# Patient Record
Sex: MALE | Race: WHITE | NOT HISPANIC OR LATINO | Employment: FULL TIME | URBAN - METROPOLITAN AREA
[De-identification: names, ages, dates, MRNs, and addresses within clinical notes are randomized per-mention and may not be internally consistent; named-entity substitution may affect disease eponyms.]

---

## 2024-08-20 ENCOUNTER — OFFICE VISIT (OUTPATIENT)
Age: 44
End: 2024-08-20
Payer: COMMERCIAL

## 2024-08-20 ENCOUNTER — TELEPHONE (OUTPATIENT)
Age: 44
End: 2024-08-20

## 2024-08-20 VITALS
DIASTOLIC BLOOD PRESSURE: 82 MMHG | HEART RATE: 66 BPM | BODY MASS INDEX: 32.86 KG/M2 | WEIGHT: 242.6 LBS | TEMPERATURE: 98.2 F | OXYGEN SATURATION: 98 % | SYSTOLIC BLOOD PRESSURE: 112 MMHG | HEIGHT: 72 IN

## 2024-08-20 DIAGNOSIS — G47.33 OSA (OBSTRUCTIVE SLEEP APNEA): Primary | ICD-10-CM

## 2024-08-20 DIAGNOSIS — F51.01 PRIMARY INSOMNIA: ICD-10-CM

## 2024-08-20 PROCEDURE — 99203 OFFICE O/P NEW LOW 30 MIN: CPT | Performed by: INTERNAL MEDICINE

## 2024-08-20 RX ORDER — FLUTICASONE PROPIONATE 50 MCG
1 SPRAY, SUSPENSION (ML) NASAL DAILY
COMMUNITY

## 2024-08-20 RX ORDER — FEXOFENADINE HCL 180 MG/1
180 TABLET ORAL DAILY
COMMUNITY

## 2024-08-20 RX ORDER — TRAZODONE HYDROCHLORIDE 50 MG/1
50 TABLET, FILM COATED ORAL
Qty: 30 TABLET | Refills: 2 | Status: SHIPPED | OUTPATIENT
Start: 2024-08-20

## 2024-08-20 NOTE — ASSESSMENT & PLAN NOTE
Occasionally has sleep maintenance insomnia.  Prescribed trazodone 50 mg nightly as needed.  He uses it sparingly most of the time on the weekends

## 2024-08-20 NOTE — PROGRESS NOTES
Sleep Medicine Outpatient Note   Brock Dunne 43 y.o. male MRN: [id number]  8/20/2024      Referring Physician: Self referred    Reason for Consultation:    Chief Complaint   Patient presents with    Sleep Apnea     Assessment/Plan:    1. OTIS (obstructive sleep apnea)  Assessment & Plan:  History of mild obstructive sleep apnea diagnosed in 2021 JALEN 13.  Started on auto titrating CPAP and deriving symptomatic benefit.  CPAP is also helping with dryness of the nasal membranes.    He is fully compliant with CPAP    Compliance data  8/19/24  83% use more than 4 hours over the past 30 days  Average use 8 hours and 42 minutes  AirSense 10 4.8-20 cm H2O with EPR at 3  Median pressure 7.2, 95th percentile 10.3  Median leak 0, 95th percentile 2.6  Residual AHI 1.0    Resupply prescription sent to [facility], nasal N20 mask  Follow up in 1 year  Orders:  -     PAP DME Resupply/Reorder  2. Primary insomnia  Assessment & Plan:  Occasionally has sleep maintenance insomnia.  Prescribed trazodone 50 mg nightly as needed.  He uses it sparingly most of the time on the weekends  Orders:  -     traZODone (DESYREL) 50 mg tablet; Take 1 tablet (50 mg total) by mouth daily at bedtime      Health Maintenance  Immunization History   Administered Date(s) Administered    COVID-19 J&J (SpotHero) vaccine 0.5 mL 03/20/2021    COVID-19 PFIZER VACCINE 0.3 ML IM 01/08/2022    COVID-19 Pfizer mRNA vacc PF gabi-sucrose 12 yr and older (Comirnaty) 10/10/2023        Return in about 1 year (around 8/20/2025).    History of Present Illness   HPI:  Brock Dunne is a 43 y.o. male who has a past medical history of allergic rhinitis and obstructive sleep apnea who is presenting to establish care for OTIS    Previously was seen at [facility].  He has been on CPAP since 2021.  Initially had a HST 11/2020 that showed JALEN 3.7 which was suspected to be a false negative.  Subsequently underwent a HST that showed JALEN 13.  OTIS symptoms included snoring,  witnessed apneas, significant bruxism biting through his mouthpiece, sweating through the night, waking up with heart racing/gasping, reflux.  He has been started on CPAP and has gained symptomatic benefit and is fully compliant.  In the past has had trouble with sleep maintenance and required a brief course of trazodone but stopped it years ago.  He only took it occasionally.  He is currently consistent with his sleep time 7 days a week.  He works as a scrub woodshop teacher.  He usually sleeps from 9 to 5a.  He will wake up several times over night around 12, 2, and 4am.    Recently has had some trouble waking up at 2 AM and having difficulty fall back into sleep.He is having some racing thoughts at night.    His first machine broke in October 2023 and says it was taking a long time for him to acquire his new CPAP he had his old machine repaired.  He is currently using that AirSense 10.  He is getting regular replacement supplies.  He is using a nasal mask without any significant side effects    No restless legs, parasomnias, cataplexy, hallucinations.    [place] 10    Historical Information   History reviewed. No pertinent past medical history.  History reviewed. No pertinent surgical history.  History reviewed. No pertinent family history.      Meds/Allergies     Current Outpatient Medications:     fexofenadine (ALLEGRA) 180 MG tablet, Take 180 mg by mouth daily, Disp: , Rfl:     fluticasone (FLONASE) 50 mcg/act nasal spray, 1 spray into each nostril daily, Disp: , Rfl:     traZODone (DESYREL) 50 mg tablet, Take 1 tablet (50 mg total) by mouth daily at bedtime, Disp: 30 tablet, Rfl: 2  No Known Allergies    Vitals: Blood pressure 112/82, pulse 66, temperature 98.2 °F (36.8 °C), temperature source Tympanic, height 6' (1.829 m), weight 110 kg (242 lb 9.6 oz), SpO2 98%. Body mass index is 32.9 kg/m². Oxygen Therapy  SpO2: 98 %  Oxygen Therapy: None (Room air)      Physical Exam  Vitals and nursing note reviewed.  "  Constitutional:       General: He is not in acute distress.     Appearance: Normal appearance. He is well-developed. He is not ill-appearing, toxic-appearing or diaphoretic.   HENT:      Head: Normocephalic and atraumatic.      Mouth/Throat:      Mouth: Mucous membranes are moist.      Pharynx: Oropharynx is clear. No oropharyngeal exudate.   Eyes:      Conjunctiva/sclera: Conjunctivae normal.   Cardiovascular:      Rate and Rhythm: Normal rate and regular rhythm.   Pulmonary:      Effort: Pulmonary effort is normal. No respiratory distress.      Breath sounds: Normal breath sounds. No stridor.   Abdominal:      Tenderness: There is no guarding.   Musculoskeletal:         General: No swelling.      Cervical back: Normal range of motion and neck supple. No rigidity.      Right lower leg: No edema.      Left lower leg: No edema.   Skin:     General: Skin is warm and dry.   Neurological:      General: No focal deficit present.      Mental Status: He is alert and oriented to person, place, and time. Mental status is at baseline.   Psychiatric:         Mood and Affect: Mood normal.             Labs:   I have personally reviewed pertinent lab results.    ABG: No results found for: \"PHART\", \"WRP1IXA\", \"PO2ART\", \"MIB7HDK\", \"D5XPFIWT\", \"BEART\", \"SOURCE\",   BNP: No results found for: \"BNP\",   CBC:No results found for: \"WBC\", \"HGB\", \"HCT\", \"MCV\", \"PLT\", \"ADJUSTEDWBC\", \"EOSPCT\", \"EOSABS\", \"NEUTOPHILPCT\", \"LYMPHOPCT\",   CMP: No results found for: \"SODIUM\", \"K\", \"CL\", \"CO2\", \"ANIONGAP\", \"BUN\", \"CREATININE\", \"GLUCOSE\", \"CALCIUM\", \"AST\", \"ALT\", \"ALKPHOS\", \"PROT\", \"BILITOT\", \"EGFR\",   PT/INR: No results found for: \"PT\", \"INR\",   Ferrtin: No components found for: \"FERRTIN\",  Magensium: No results found for: \"MAGNESIUM\",    Imaging and other studies: I have personally reviewed pertinent reports.   and I have personally reviewed pertinent films in PACS    Sleep Study:  HST 11/2020 that showed JALEN 3.7 which was suspected to be a false " "negative.  Subsequently underwent a HST that showed JALEN 13.     Compliance data  8/17/2024  100% use over 30 days  Average use 8 hours 20 minutes  [id number]  AirSense 10 4.8/-20  Pressure median 6.9/95% 10  Leaks median 0.2, 95% 2.4  AHI 0.8    Osman Tran MD  Pulmonary, Critical Care and Sleep Medicine  [facility]     Portions of the record may have been created with voice recognition software. Occasional wrong word or \"sound a like\" substitutions may have occurred due to the inherent limitations of voice recognition software. Please read the chart carefully and recognize, using context, where substitutions have occurred.     "

## 2024-08-20 NOTE — ASSESSMENT & PLAN NOTE
History of mild obstructive sleep apnea diagnosed in 2021 JALEN 13.  Started on auto titrating CPAP and deriving symptomatic benefit.  CPAP is also helping with dryness of the nasal membranes.    He is fully compliant with CPAP    Compliance data  8/19/24  83% use more than 4 hours over the past 30 days  Average use 8 hours and 42 minutes  AirSense 10 4.8-20 cm H2O with EPR at 3  Median pressure 7.2, 95th percentile 10.3  Median leak 0, 95th percentile 2.6  Residual AHI 1.0    Resupply prescription sent to [facility], nasal N20 mask  Follow up in 1 year

## 2024-09-25 ENCOUNTER — OFFICE VISIT (OUTPATIENT)
Dept: URGENT CARE | Facility: CLINIC | Age: 44
End: 2024-09-25
Payer: COMMERCIAL

## 2024-09-25 VITALS
RESPIRATION RATE: 16 BRPM | WEIGHT: 238 LBS | SYSTOLIC BLOOD PRESSURE: 122 MMHG | HEART RATE: 73 BPM | TEMPERATURE: 98.1 F | HEIGHT: 72 IN | OXYGEN SATURATION: 97 % | BODY MASS INDEX: 32.23 KG/M2 | DIASTOLIC BLOOD PRESSURE: 78 MMHG

## 2024-09-25 DIAGNOSIS — J06.9 VIRAL URI WITH COUGH: Primary | ICD-10-CM

## 2024-09-25 PROCEDURE — 99213 OFFICE O/P EST LOW 20 MIN: CPT

## 2024-09-25 RX ORDER — PREDNISONE 10 MG/1
TABLET ORAL
Qty: 15 TABLET | Refills: 0 | Status: SHIPPED | OUTPATIENT
Start: 2024-09-25 | End: 2024-09-30

## 2024-09-25 NOTE — PATIENT INSTRUCTIONS
Your symptoms are consistent with a viral illness.    For nasal/sinus congestion you can try steam, warm compresses, saline nasal spray, Neti pot, nasal steroid (Flonase, Nasocort), or nasal decongestant (Afrin - for 3 days only).    You can try a decongestant (Sudafed) if > 6 years of age and no history of high blood pressure.    For cough you can take an over-the-counter expectorant such as plain Robitussion or Mucinex. A spoonful of honey at bedtime may also be helpful.    For cold symptoms with high blood pressure take Coricidin cough/cold.     For sore throat you can use Cepacol lozenges, do warm salt water gargles, drink warm water with lemon or herbal teas, or use an over-the-counter throat spray (Chloraseptic).    You can take ibuprofen/Motrin and acetaminophen/Tylenol as needed for pain, fever, body aches. Do not take ibuprofen/Motrin/Advil if you have a history of heart disease, bleeding ulcers, or if you take blood thinners.     Drink plenty of fluids to stay hydrated. Airborne for extra vitamin C and zinc.    Follow up with your PCP in 5-7 days for persistent symptoms.    Go to the ER if symptoms worsen.

## 2024-09-25 NOTE — PROGRESS NOTES
[facility]        NAME: Brock Dunne is a 43 y.o. male  : 1980    MRN: [id number]  DATE: 2024  TIME: 4:24 PM    Assessment and Plan   Viral URI with cough [J06.9]  1. Viral URI with cough  dextromethorphan-guaifenesin (MUCINEX DM)  MG per 12 hr tablet    predniSONE 10 mg tablet            Patient Instructions     Your symptoms are consistent with a viral illness.    For nasal/sinus congestion you can try steam, warm compresses, saline nasal spray, Neti pot, nasal steroid (Flonase, Nasocort), or nasal decongestant (Afrin - for 3 days only).    You can try a decongestant (Sudafed) if > 6 years of age and no history of high blood pressure.    For cough you can take an over-the-counter expectorant such as plain Robitussion or Mucinex. A spoonful of honey at bedtime may also be helpful.    For cold symptoms with high blood pressure take Coricidin cough/cold.     For sore throat you can use Cepacol lozenges, do warm salt water gargles, drink warm water with lemon or herbal teas, or use an over-the-counter throat spray (Chloraseptic).    You can take ibuprofen/Motrin and acetaminophen/Tylenol as needed for pain, fever, body aches. Do not take ibuprofen/Motrin/Advil if you have a history of heart disease, bleeding ulcers, or if you take blood thinners.     Drink plenty of fluids to stay hydrated. Airborne for extra vitamin C and zinc.    Follow up with your PCP in 5-7 days for persistent symptoms.    Go to the ER if symptoms worsen.     If tests are performed, our office will contact you with results only if changes need to made to the care plan discussed with you at the visit. You can review your full results on [facility].      Chief Complaint     Chief Complaint   Patient presents with    Cold Like Symptoms     Pt reports 1 week of congestion, PND, cough and sore throat. Daughter has croup. Taking tylenol/cough drops.          History of Present Illness        43-year-old male presenting with cold-like symptoms x 1 week. Patient reports nasal congestion, runny nose, PND, sore throat, and a dry cough. Denies fevers/chills. Cough is non-productive without associated wheezing, shortness of breath, chest tightness, and chest pain. No GI symptoms or rashes. Taking Tylenol and using cough drops. Young daughter at home recently diagnosed with croup.        Review of Systems   Review of Systems   Constitutional:  Negative for chills and fever.   HENT:  Positive for congestion, postnasal drip, rhinorrhea and sore throat. Negative for ear pain and sinus pressure.    Eyes:  Negative for discharge and redness.   Respiratory:  Positive for cough. Negative for chest tightness, shortness of breath and wheezing.    Cardiovascular:  Negative for chest pain and palpitations.   Gastrointestinal:  Negative for abdominal pain, diarrhea, nausea and vomiting.   Skin:  Negative for pallor and rash.   Neurological:  Negative for dizziness, light-headedness and headaches.         Current Medications       Current Outpatient Medications:     dextromethorphan-guaifenesin (MUCINEX DM)  MG per 12 hr tablet, Take 1 tablet by mouth every 12 (twelve) hours, Disp: 14 tablet, Rfl: 0    fexofenadine (ALLEGRA) 180 MG tablet, Take 180 mg by mouth daily, Disp: , Rfl:     fluticasone (FLONASE) 50 mcg/act nasal spray, 1 spray into each nostril daily, Disp: , Rfl:     predniSONE 10 mg tablet, Take 5 tablets (50 mg total) by mouth daily for 1 day, THEN 4 tablets (40 mg total) daily for 1 day, THEN 3 tablets (30 mg total) daily for 1 day, THEN 2 tablets (20 mg total) daily for 1 day, THEN 1 tablet (10 mg total) daily for 1 day., Disp: 15 tablet, Rfl: 0    traZODone (DESYREL) 50 mg tablet, Take 1 tablet (50 mg total) by mouth daily at bedtime, Disp: 30 tablet, Rfl: 2    Current Allergies     Allergies as of 09/25/2024    (No Known Allergies)            The following portions of the patient's history were  reviewed and updated as appropriate: allergies, current medications, past family history, past medical history, past social history, past surgical history and problem list.     History reviewed. No pertinent past medical history.    History reviewed. No pertinent surgical history.    History reviewed. No pertinent family history.      Medications have been verified.        Objective   /78   Pulse 73   Temp 98.1 °F (36.7 °C)   Resp 16   Ht 6' (1.829 m)   Wt 108 kg (238 lb)   SpO2 97%   BMI 32.28 kg/m²        Physical Exam     Physical Exam  Vitals and nursing note reviewed.   Constitutional:       General: He is not in acute distress.     Appearance: He is not ill-appearing or diaphoretic.   HENT:      Head: Normocephalic and atraumatic.      Right Ear: Tympanic membrane, ear canal and external ear normal.      Left Ear: Tympanic membrane, ear canal and external ear normal.      Nose: Congestion present.      Mouth/Throat:      Mouth: Mucous membranes are moist.      Pharynx: Uvula midline. Posterior oropharyngeal erythema present. No pharyngeal swelling or oropharyngeal exudate.      Comments: PND  Eyes:      Conjunctiva/sclera: Conjunctivae normal.      Pupils: Pupils are equal, round, and reactive to light.   Cardiovascular:      Rate and Rhythm: Normal rate and regular rhythm.      Pulses: Normal pulses.      Heart sounds: Normal heart sounds.   Pulmonary:      Effort: Pulmonary effort is normal.      Breath sounds: Normal breath sounds.   Musculoskeletal:         General: Normal range of motion.      Cervical back: Normal range of motion and neck supple.   Lymphadenopathy:      Cervical: No cervical adenopathy.   Skin:     General: Skin is warm and dry.      Capillary Refill: Capillary refill takes less than 2 seconds.   Neurological:      Mental Status: He is alert and oriented to person, place, and time.

## 2024-10-26 DIAGNOSIS — F51.01 PRIMARY INSOMNIA: ICD-10-CM

## 2024-10-28 RX ORDER — TRAZODONE HYDROCHLORIDE 50 MG/1
50 TABLET, FILM COATED ORAL
Qty: 84 TABLET | Refills: 1 | Status: SHIPPED | OUTPATIENT
Start: 2024-10-28

## 2025-08-06 ENCOUNTER — TELEPHONE (OUTPATIENT)
Age: 45
End: 2025-08-06